# Patient Record
(demographics unavailable — no encounter records)

---

## 2025-04-29 NOTE — DATA REVIEWED
[MRI] : MRI [Left] : left [Ankle] : ankle [Report was reviewed and noted in the chart] : The report was reviewed and noted in the chart [I independently reviewed and interpreted images and report] : I independently reviewed and interpreted images and report [I reviewed the films/CD] : I reviewed the films/CD [FreeTextEntry1] : MRI left ankle reveals evidence of multiple ligament sprains, intact syndesmotic ligaments, no bone marrow edema about lateral physis, mild bone contusion about medial malleolus

## 2025-04-29 NOTE — HISTORY OF PRESENT ILLNESS
[de-identified] : The patient is a 12 year  old RT hand dominant female who presents today complaining of LT Ankle pain.   Date of Injury/Onset: 4/25/25 Pain:    At Rest: 5/10  With Activity:  9/10  Mechanism of injury: coming down from jumping in a basketball game and twisted her ankle when she landed This is NOT a Work Related Injury being treated under Worker's Compensation. This IS an athletic injury occurring associated with an interscholastic or organized sports team. 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

## 2025-04-29 NOTE — IMAGING
[Left] : left ankle [Weight -] : weightbearing [de-identified] : The patient is a well appearing 12 year  old female of their stated age. Patient ambulates IN LONG PNEUMATIC CAM BOOT. Negative straight leg raise.   Effected Foot and Ankle: LEFT    Inspection: Erythema: None Ecchymosis: None Abrasions: None Effusion: None Deformity: None Pes [place] Valgus: Negative Pes Cavus: Negative   Palpation: Crepitus: None Medial Malleolus: Nontender Lateral Malleolus: Nontender Syndesmosis: TENDER  Distal Fibula: TENDER  ATFL: Nontender CFL: Nontender Deltoid: Nontender Calcaneus: Nontender Talar Head/Neck: Nontender Peroneal Tendons: Nontender Posterior Tibialis: Nontender Achilles Tendon: Nontender & Intact Anterior Capsule: Nontender Hindfoot: Nontender Midfoot: Nontender Forefoot: Nontender   ROM: Ankle Dorsiflexion: 30 degrees Ankle Plantar Flexion: 45 degrees Motor: Dorsiflexion: 5 out of 5 Plantar Flexion: 5 out of 5 Inversion: 4+ out of 5 Eversion: 4+ out of 5 EHL: 5 out of 5 FHL: 5 out of 5   Provocative Testing: Anterior Drawer: Negative Syndesmosis Squeeze Test: Negative Carlton's Test: Negative Midfoot Stability/Rotation: Negative Heel Raise Inversion: Normal Triple Hop: POSITIVE  Neurologic Exam: L4-S1 Sensation: Grossly Intact Vascular Exam/Pulses: Dorsalis Pedis: 2+ Posterior Tibialis: 2+ Capillary Refill: <2 Seconds Other Exams: None   Pertinent Contralateral Findings: None   Assessment: The patient is a 12-year-old female with left ankle pain and radiographic and physical exam findings consistent with ankle sprain. The patient's condition is acute Documents/Results Reviewed Today: X-Ray and MRI left ankle  Tests/Studies Independently Interpreted Today: X-Ray left ankle reveals evidence of skeletally immature individual, widening if distal fibula physis. MRI left ankle reveals evidence of multiple ligament sprains, intact syndesmotic ligaments, no bone marrow edema about lateral physis, mild bone contusion about medial malleolus.  Pertinent findings include: Intact ankle range of motion, mild weakness, tender distal fibula, tender syndesmosis.  Confounding medical conditions/concerns: None   Plan: Start physical therapy, HEP, and stretching for ankle sprain. The patient will continue use of long Pneumatic CAM boot to ensure stability and proper healing of ankle sprain. Discussed appropriate use of OTC anti-inflammatories as needed for pain, inflammation, and discomfort - use as directed and take with food. Upon review of radiographic imaging, we are not concerned for any physeal injury as they appear to be closing. The patient will follow up in 2 weeks to discuss possible transition into lace-up ankle brace. Until then, the patient is out of gym/sport specific activity. Modify activity as discussed. Tests Ordered: None  Prescription Medications Ordered: Discussed appropriate use of OTC anti-inflammatories and analgesics (including but not limited to Aleve, Advil, Tylenol, Motrin, Ibuprofen, Voltaren gel, etc.) Braces/DME Ordered: Continue use of pneumatic CAM boot  Activity/Work/Sports Status: Shut down from gym & sports  Additional Instructions: None Follow-Up: 2 weeks   The patient's current medication management of their orthopedic diagnosis was reviewed today: The patient declined and/or was contraindicated for the recommended prescription medication Naprosyn and will use over the counter Advil, Aleve, Voltaren Gel or Tylenol as directed. (1) We discussed a comprehensive treatment plan that included possible pharmaceutical management involving the use of prescription strength medications including but not limited to options such as oral Naprosyn 500mg BID, once daily Meloxicam 15 mg, or 500-650 mg Tylenol versus over the counter oral medications and topical prescription NSAID Pennsaid vs over the counter Voltaren gel.  Based on our extensive discussion, the patient declined prescription medication and will use over the counter Advil, Alleve, Voltaren Gel or Tylenol as directed. (2) There is a moderate risk of morbidity with further treatment, especially from use of prescription strength medications and possible side effects of these medications which include upset stomach with oral medications, skin reactions to topical medications and cardiac/renal issues with long term use. (3) I recommended that the patient follow-up with their medical physician to discuss any significant specific potential issues with long term medication use such as interactions with current medications or with exacerbation of underlying medical comorbidities. (4) The benefits and risks associated with use of injectable, oral or topical, prescription and over the counter anti-inflammatory medications were discussed with the patient. The patient voiced understanding of the risks including but not limited to bleeding, stroke, kidney dysfunction, heart disease, and were referred to the black box warning label for further information.   ILivier attest that this documentation has been prepared under the direction and in the presence of Provider Dr. Cody Mak.   The documentation recorded by the scribe accurately reflects the services IDr. Cody, personally performed and the decisions made by me. [FreeTextEntry9] : X-Ray left ankle reveals evidence of skeletally immature individual, widening if distal fibula physis

## 2025-04-29 NOTE — HISTORY OF PRESENT ILLNESS
[de-identified] : The patient is a 12 year  old RT hand dominant female who presents today complaining of LT Ankle pain.   Date of Injury/Onset: 4/25/25 Pain:    At Rest: 5/10  With Activity:  9/10  Mechanism of injury: coming down from jumping in a basketball game and twisted her ankle when she landed This is NOT a Work Related Injury being treated under Worker's Compensation. This IS an athletic injury occurring associated with an interscholastic or organized sports team. 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

## 2025-04-29 NOTE — IMAGING
[Left] : left ankle [Weight -] : weightbearing [de-identified] : The patient is a well appearing 12 year  old female of their stated age. Patient ambulates IN LONG PNEUMATIC CAM BOOT. Negative straight leg raise.   Effected Foot and Ankle: LEFT    Inspection: Erythema: None Ecchymosis: None Abrasions: None Effusion: None Deformity: None Pes [place] Valgus: Negative Pes Cavus: Negative   Palpation: Crepitus: None Medial Malleolus: Nontender Lateral Malleolus: Nontender Syndesmosis: TENDER  Distal Fibula: TENDER  ATFL: Nontender CFL: Nontender Deltoid: Nontender Calcaneus: Nontender Talar Head/Neck: Nontender Peroneal Tendons: Nontender Posterior Tibialis: Nontender Achilles Tendon: Nontender & Intact Anterior Capsule: Nontender Hindfoot: Nontender Midfoot: Nontender Forefoot: Nontender   ROM: Ankle Dorsiflexion: 30 degrees Ankle Plantar Flexion: 45 degrees Motor: Dorsiflexion: 5 out of 5 Plantar Flexion: 5 out of 5 Inversion: 4+ out of 5 Eversion: 4+ out of 5 EHL: 5 out of 5 FHL: 5 out of 5   Provocative Testing: Anterior Drawer: Negative Syndesmosis Squeeze Test: Negative Carlton's Test: Negative Midfoot Stability/Rotation: Negative Heel Raise Inversion: Normal Triple Hop: POSITIVE  Neurologic Exam: L4-S1 Sensation: Grossly Intact Vascular Exam/Pulses: Dorsalis Pedis: 2+ Posterior Tibialis: 2+ Capillary Refill: <2 Seconds Other Exams: None   Pertinent Contralateral Findings: None   Assessment: The patient is a 12-year-old female with left ankle pain and radiographic and physical exam findings consistent with ankle sprain. The patient's condition is acute Documents/Results Reviewed Today: X-Ray and MRI left ankle  Tests/Studies Independently Interpreted Today: X-Ray left ankle reveals evidence of skeletally immature individual, widening if distal fibula physis. MRI left ankle reveals evidence of multiple ligament sprains, intact syndesmotic ligaments, no bone marrow edema about lateral physis, mild bone contusion about medial malleolus.  Pertinent findings include: Intact ankle range of motion, mild weakness, tender distal fibula, tender syndesmosis.  Confounding medical conditions/concerns: None   Plan: Start physical therapy, HEP, and stretching for ankle sprain. The patient will continue use of long Pneumatic CAM boot to ensure stability and proper healing of ankle sprain. Discussed appropriate use of OTC anti-inflammatories as needed for pain, inflammation, and discomfort - use as directed and take with food. Upon review of radiographic imaging, we are not concerned for any physeal injury as they appear to be closing. The patient will follow up in 2 weeks to discuss possible transition into lace-up ankle brace. Until then, the patient is out of gym/sport specific activity. Modify activity as discussed. Tests Ordered: None  Prescription Medications Ordered: Discussed appropriate use of OTC anti-inflammatories and analgesics (including but not limited to Aleve, Advil, Tylenol, Motrin, Ibuprofen, Voltaren gel, etc.) Braces/DME Ordered: Continue use of pneumatic CAM boot  Activity/Work/Sports Status: Shut down from gym & sports  Additional Instructions: None Follow-Up: 2 weeks   The patient's current medication management of their orthopedic diagnosis was reviewed today: The patient declined and/or was contraindicated for the recommended prescription medication Naprosyn and will use over the counter Advil, Aleve, Voltaren Gel or Tylenol as directed. (1) We discussed a comprehensive treatment plan that included possible pharmaceutical management involving the use of prescription strength medications including but not limited to options such as oral Naprosyn 500mg BID, once daily Meloxicam 15 mg, or 500-650 mg Tylenol versus over the counter oral medications and topical prescription NSAID Pennsaid vs over the counter Voltaren gel.  Based on our extensive discussion, the patient declined prescription medication and will use over the counter Advil, Alleve, Voltaren Gel or Tylenol as directed. (2) There is a moderate risk of morbidity with further treatment, especially from use of prescription strength medications and possible side effects of these medications which include upset stomach with oral medications, skin reactions to topical medications and cardiac/renal issues with long term use. (3) I recommended that the patient follow-up with their medical physician to discuss any significant specific potential issues with long term medication use such as interactions with current medications or with exacerbation of underlying medical comorbidities. (4) The benefits and risks associated with use of injectable, oral or topical, prescription and over the counter anti-inflammatory medications were discussed with the patient. The patient voiced understanding of the risks including but not limited to bleeding, stroke, kidney dysfunction, heart disease, and were referred to the black box warning label for further information.   ILivier attest that this documentation has been prepared under the direction and in the presence of Provider Dr. Cody Mak.   The documentation recorded by the scribe accurately reflects the services IDr. Cody, personally performed and the decisions made by me. [FreeTextEntry9] : X-Ray left ankle reveals evidence of skeletally immature individual, widening if distal fibula physis

## 2025-04-30 NOTE — HISTORY OF PRESENT ILLNESS
[de-identified] : The patient is a 12 year old [RIGHT/LEFT] hand dominant female who presents today complaining of left ankle. Date of Injury/Onset: 4/25/25 Pain: At Rest: 6/10 With Activity: 8/10 Mechanism of injury: patient states she rolled her left ankle over in a basketball game This is NOT a Work Related Injury being treated under Worker's Compensation. This is an athletic injury occurring associated with an interscholastic or organized sports team. Quality of symptoms: sharp pain on lateral side of left ankle  Improves with: ice, ibuprofen Worse with: walking, stairs Prior treatment: none Prior Imaging: none  Out of work/sport: unknown currently  School/Sport/Position/Occupation: 6th grade @ [employer], basketball, lacrosse Additional Information: None   Tran Turpin a 12-year-old female accompanied by her mother presents to orthopedic urgent care clinic with L ankle injury from basketball. While jumping, she landed and inverted ankle. Pain is localized to lateral malleolus, constant but worsens with weight-bearing. She is limping when walking. The patient denies numbness, tingling, significant bruising or swelling. She is unable to hop on affected foot. There is increased pain with ankle flexion/extension. She can walk with difficulty and pain.  Review of Systems (ROS): Musculoskeletal: (+) ankle pain, difficulty walking, limping Neurological: (-) numbness or tingling in feet

## 2025-04-30 NOTE — IMAGING
[de-identified] : The patient is a well appearing 12 year old female of their stated age.  Mother present in exam room Patient ambulates with a antalgic gait. Negative straight leg raise.  Effected Foot and Ankle:  Inspection: Erythema: None Ecchymosis: None Abrasions: None Effusion: None Deformity: None Pes [place] Valgus: Negative Pes Cavus: Negative  Palpation: Crepitus: None Medial Maleolus: Nontender Lateral Maleolus: Nontender Syndesmosis: Tender Proximal Fibula: Nontender Distal fibula: Tender ATFL: Nontender CFL: Nontender Deltoid: Nontender Calcaneus: Nontender [place]ar Head/Neck: Nontender Peroneal Tendons: Nontender Posterior Tibialis: Nontender Achilles Tendon: Nontender & Intact Anterior Capsule: Nontender Hindfoot: Nontender Midfoot: Nontender Forefoot: Nontender  ROM: Ankle Dorsiflexion: 30 degrees Ankle Plantar Flexion: 45 degrees Motor: Dorsiflexion: 5 out of 5 Plantar Flexion: 5 out of 5 Inversion: 4 out of 5 Eversion: 4 out of 5 EHL: 5 out of 5 FHL: 5 out of 5  Provocative Testing: Anterior Drawer: Negative Syndesmosis Squeeze Test: Positive Carlton's Test: Negative Midfoot Stability/Rotation: Negative Heel Raise Inversion: Normal Triple Hop: Positive Neurologic Exam: L4-S1 Sensation: Grossly Intact Vascular Exam/Pulses: Dorsalis Pedis: 2+ Posterior Tibialis: 2+ Capillary Refill: <2 Seconds Other Exams: None  Pertinent Contralateral Findings: None  Assessment: The patient is a 12 year old female with acute inversion ankle injury while playing basketball and radiographic and physical exam findings consistent with inversion ankle sprain with concern for occult injury. The patients condition is acute Documents/Results Reviewed Today: Radiographs completed in clinic Tests/Studies Independently Interpreted Today: Three-view plain film radiograph of the ankle.   Pertinent findings include:No acute osseous abnormalities.  Slight irregularity noted to the fibula growth plate. Confounding medical conditions/concerns: None  Plan: Patient was given a follow-up appointment with orthopedic foot and ankle specialist for further evaluation and management.  Appointment will be provided by the urgent care  staff prior to discharge. Treat the patient's pain with naproxen 250 mg p.o. twice daily for 7 to 14 days as needed.  Counseled the risk of GI bleeding renal injury prolonged use NSAIDs. Encouraged Price.  10 to 15 minutes of ice every 1-2 hours for the next 72 hours.  Then every 2-3 hours thereafter as needed for pain relief.  Never place ice directly on the skin as it can cause damage. Patient fitted for and provided cam walker boot and crutches in clinic.  Proper crutch training completed. Radiographs reviewed with the patient at the bedside.  Ample time provided to ask questions. Activity restrictions were discussed in clinic. Patient given a prescription for physical therapy.  Do not start until after seeing orthopedic specialist. Patient educated on diagnosis and provided reassurance.  Answered all questions. Tests Ordered:  Due to worsening pain and instability with mechanical symptoms, patient will obtain MRI to eval for concern for occult injury over the fibular growth plate.  Evaluate for ligamentous integrity. Prescription Medications Ordered: 250 mg naproxen Braces/DME Ordered: CAM Walker boot short, crutches Activity/Work/Sports Status: Activity restrictions discussed.  School note provided for no sports or gym until cleared by orthopedics.  School accommodations also provided. Additional Instructions: None Follow-Up: Follow-up with orthopedic specialist which you were given appointment for today by the orthopedic urgent care  staff as directed.Follow-up with primary care manager for continuity of care. Discussed and reviewed current medications  Patient to continue with present medications - all medications reconciled/reviewed during this visit and listed above.    Increase fluid intake.  At least  40 minutes was spent with patient face to face examining and reviewing findings/results during this visit. Ample time was provided to answer any questions or address concerns to the patients satisfaction.  The patient's current medication management of their orthopedic diagnosis was reviewed today: (1) We discussed a comprehensive treatment plan that included possible pharmaceutical management involving the use of prescription strength medications including but not limited to options such as oral Naprosyn 500mg BID, once daily Meloxicam 15 mg, or 500-650 mg Tylenol versus over the counter oral medications and topical prescription NSAID Pennsaid vs over the counter Voltaren gel. (2) There is a moderate risk of morbidity with further treatment, especially from use of prescription strength medications and possible side effects of these medications which include upset stomach with oral medications, skin reactions to topical medications and cardiac/renal issues with long term use. (3) I recommended that the patient follow-up with their medical physician to discuss any significant specific potential issues with long term medication use such as interactions with current medications or with exacerbation of underlying medical comorbidities. (4) The benefits and risks associated with use of injectable, oral or topical, prescription and over the counter anti-inflammatory medications were discussed with the patient. The patient voiced understanding of the risks including but not limited to bleeding, stroke, kidney dysfunction, heart disease, and were referred to the black box warning label for further information.  [Left] : left ankle [Weight -] : weightbearing [Open growth plates] : Open growth plates [de-identified] : Slight irregularity noted near the fibular growth plate.  Concern for occult injury

## 2025-04-30 NOTE — IMAGING
[de-identified] : The patient is a well appearing 12 year old female of their stated age.  Mother present in exam room Patient ambulates with a antalgic gait. Negative straight leg raise.  Effected Foot and Ankle:  Inspection: Erythema: None Ecchymosis: None Abrasions: None Effusion: None Deformity: None Pes [place] Valgus: Negative Pes Cavus: Negative  Palpation: Crepitus: None Medial Maleolus: Nontender Lateral Maleolus: Nontender Syndesmosis: Tender Proximal Fibula: Nontender Distal fibula: Tender ATFL: Nontender CFL: Nontender Deltoid: Nontender Calcaneus: Nontender Talar Head/Neck: Nontender Peroneal Tendons: Nontender Posterior Tibialis: Nontender Achilles Tendon: Nontender & Intact Anterior Capsule: Nontender Hindfoot: Nontender Midfoot: Nontender Forefoot: Nontender  ROM: Ankle Dorsiflexion: 30 degrees Ankle Plantar Flexion: 45 degrees Motor: Dorsiflexion: 5 out of 5 Plantar Flexion: 5 out of 5 Inversion: 4 out of 5 Eversion: 4 out of 5 EHL: 5 out of 5 FHL: 5 out of 5  Provocative Testing: Anterior Drawer: Negative Syndesmosis Squeeze Test: Positive Carlton's Test: Negative Midfoot Stability/Rotation: Negative Heel Raise Inversion: Normal Triple Hop: Positive Neurologic Exam: L4-S1 Sensation: Grossly Intact Vascular Exam/Pulses: Dorsalis Pedis: 2+ Posterior Tibialis: 2+ Capillary Refill: <2 Seconds Other Exams: None  Pertinent Contralateral Findings: None  Assessment: The patient is a 12 year old female with acute inversion ankle injury while playing basketball and radiographic and physical exam findings consistent with inversion ankle sprain with concern for occult injury. The patients condition is acute Documents/Results Reviewed Today: Radiographs completed in clinic Tests/Studies Independently Interpreted Today: Three-view plain film radiograph of the ankle.   Pertinent findings include:No acute osseous abnormalities.  Slight irregularity noted to the fibula growth plate. Confounding medical conditions/concerns: None  Plan: Patient was given a follow-up appointment with orthopedic foot and ankle specialist for further evaluation and management.  Appointment will be provided by the urgent care  staff prior to discharge. Treat the patient's pain with naproxen 250 mg p.o. twice daily for 7 to 14 days as needed.  Counseled the risk of GI bleeding renal injury prolonged use NSAIDs. Encouraged Price.  10 to 15 minutes of ice every 1-2 hours for the next 72 hours.  Then every 2-3 hours thereafter as needed for pain relief.  Never place ice directly on the skin as it can cause damage. Patient fitted for and provided cam walker boot and crutches in clinic.  Proper crutch training completed. Radiographs reviewed with the patient at the bedside.  Ample time provided to ask questions. Activity restrictions were discussed in clinic. Patient given a prescription for physical therapy.  Do not start until after seeing orthopedic specialist. Patient educated on diagnosis and provided reassurance.  Answered all questions. Tests Ordered:  Due to worsening pain and instability with mechanical symptoms, patient will obtain MRI to eval for concern for occult injury over the fibular growth plate.  Evaluate for ligamentous integrity. Prescription Medications Ordered: 250 mg naproxen Braces/DME Ordered: CAM Walker boot short, crutches Activity/Work/Sports Status: Activity restrictions discussed.  School note provided for no sports or gym until cleared by orthopedics.  School accommodations also provided. Additional Instructions: None Follow-Up: Follow-up with orthopedic specialist which you were given appointment for today by the orthopedic urgent care  staff as directed.Follow-up with primary care manager for continuity of care. Discussed and reviewed current medications  Patient to continue with present medications - all medications reconciled/reviewed during this visit and listed above.    Increase fluid intake.  At least  40 minutes was spent with patient face to face examining and reviewing findings/results during this visit. Ample time was provided to answer any questions or address concerns to the patients satisfaction.  The patient's current medication management of their orthopedic diagnosis was reviewed today: (1) We discussed a comprehensive treatment plan that included possible pharmaceutical management involving the use of prescription strength medications including but not limited to options such as oral Naprosyn 500mg BID, once daily Meloxicam 15 mg, or 500-650 mg Tylenol versus over the counter oral medications and topical prescription NSAID Pennsaid vs over the counter Voltaren gel. (2) There is a moderate risk of morbidity with further treatment, especially from use of prescription strength medications and possible side effects of these medications which include upset stomach with oral medications, skin reactions to topical medications and cardiac/renal issues with long term use. (3) I recommended that the patient follow-up with their medical physician to discuss any significant specific potential issues with long term medication use such as interactions with current medications or with exacerbation of underlying medical comorbidities. (4) The benefits and risks associated with use of injectable, oral or topical, prescription and over the counter anti-inflammatory medications were discussed with the patient. The patient voiced understanding of the risks including but not limited to bleeding, stroke, kidney dysfunction, heart disease, and were referred to the black box warning label for further information.  [Left] : left ankle [Weight -] : weightbearing [Open growth plates] : Open growth plates [de-identified] : Slight irregularity noted near the fibular growth plate.  Concern for occult injury

## 2025-04-30 NOTE — HISTORY OF PRESENT ILLNESS
[de-identified] : The patient is a 12 year old [RIGHT/LEFT] hand dominant female who presents today complaining of left ankle. Date of Injury/Onset: 4/25/25 Pain: At Rest: 6/10 With Activity: 8/10 Mechanism of injury: patient states she rolled her left ankle over in a basketball game This is NOT a Work Related Injury being treated under Worker's Compensation. This is an athletic injury occurring associated with an interscholastic or organized sports team. Quality of symptoms: sharp pain on lateral side of left ankle  Improves with: ice, ibuprofen Worse with: walking, stairs Prior treatment: none Prior Imaging: none  Out of work/sport: unknown currently  School/Sport/Position/Occupation: 6th grade @ [employer], basketball, lacrosse Additional Information: None   Tran Turpin a 12-year-old female accompanied by her mother presents to orthopedic urgent care clinic with L ankle injury from basketball. While jumping, she landed and inverted ankle. Pain is localized to lateral malleolus, constant but worsens with weight-bearing. She is limping when walking. The patient denies numbness, tingling, significant bruising or swelling. She is unable to hop on affected foot. There is increased pain with ankle flexion/extension. She can walk with difficulty and pain.  Review of Systems (ROS): Musculoskeletal: (+) ankle pain, difficulty walking, limping Neurological: (-) numbness or tingling in feet

## 2025-05-13 NOTE — HISTORY OF PRESENT ILLNESS
[de-identified] : The patient is a 12 year  old RT hand dominant female who presents today complaining of LT Ankle pain.   Date of Injury/Onset: 4/25/25 Pain:    At Rest: 0/10  With Activity:  2/10  Mechanism of injury: coming down from jumping in a basketball game and twisted her ankle when she landed This is NOT a Work Related Injury being treated under Worker's Compensation. This IS an athletic injury occurring associated with an interscholastic or organized sports team. 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 Using cam boot. In PT 2x/week working on strengthening, some balance.  Additional Information: None

## 2025-05-13 NOTE — IMAGING
[de-identified] : The patient is a well appearing 12 year  old female of their stated age. Patient ambulates IN LONG PNEUMATIC CAM BOOT. Negative straight leg raise.   Effected Foot and Ankle: LEFT    Inspection: Erythema: None Ecchymosis: None Abrasions: None Effusion: None Deformity: None Pes [place] Valgus: Negative Pes Cavus: Negative   Palpation: Crepitus: None Medial Malleolus: Nontender Lateral Malleolus: Nontender Syndesmosis: MILDLY TENDER  Distal Fibula: Nontender  ATFL: TENDER  CFL: Nontender Deltoid: Nontender Calcaneus: Nontender Talar Head/Neck: Nontender Peroneal Tendons: Nontender Posterior Tibialis: Nontender Achilles Tendon: Nontender & Intact Anterior Capsule: Nontender Hindfoot: Nontender Midfoot: Nontender Forefoot: Nontender   ROM: Ankle Dorsiflexion: 30 degrees Ankle Plantar Flexion: 45 degrees Motor: Dorsiflexion: 5 out of 5 Plantar Flexion: 5 out of 5 Inversion: 5 out of 5 Eversion: 5 out of 5 EHL: 5 out of 5 FHL: 5 out of 5   Provocative Testing: Anterior Drawer: Negative Syndesmosis Squeeze Test: Negative Carlton's Test: Negative Midfoot Stability/Rotation: Negative Heel Raise Inversion: Normal Triple Hop: Negative  Neurologic Exam: L4-S1 Sensation: Grossly Intact Vascular Exam/Pulses: Dorsalis Pedis: 2+ Posterior Tibialis: 2+ Capillary Refill: <2 Seconds Other Exams: None   Pertinent Contralateral Findings: None   Assessment: The patient is a 12-year-old female with left ankle pain and radiographic and physical exam findings consistent with resolved ankle sprain. The patient's condition is acute Documents/Results Reviewed Today: None  Tests/Studies Independently Interpreted Today: None  Pertinent findings include: Intact ankle range of motion, trace effusion, tender ATFL, mildly tender syndesmosis, -syndesmosis squeeze test, -triple hop.   Confounding medical conditions/concerns: None   Plan: The patient reports gradual improvement in pain and mechanical symptoms related to ankle sprain secondary to proper use of Pneumatic CAM boot. Continue physical therapy, HEP, and stretching. At this time, the patient will transition from pneumatic CAM boot into lace-up ankle support - fitted and dispensed in office today. Continue PRN use of Naproxen 500mg - use as directed and take with food. The patient will continue to work on ankle stability and proprioception as they increase back into sport specific activity. Reviewed the importance of increasing his activity gradually and avoiding fatigue. Modify activity as discussed. Tests Ordered: None  Prescription Medications Ordered: Discussed appropriate use of OTC anti-inflammatories and analgesics (including but not limited to Aleve, Advil, Tylenol, Motrin, Ibuprofen, Voltaren gel, etc.) Braces/DME Ordered: Lace-up ankle brace   Activity/Work/Sports Status: Cleared in lace-up; use for next 6-8 weeks  Additional Instructions: None Follow-Up: PRN   The patient's current medication management of their orthopedic diagnosis was reviewed today: The patient declined and/or was contraindicated for the recommended prescription medication Naprosyn and will use over the counter Advil, Aleve, Voltaren Gel or Tylenol as directed. (1) We discussed a comprehensive treatment plan that included possible pharmaceutical management involving the use of prescription strength medications including but not limited to options such as oral Naprosyn 500mg BID, once daily Meloxicam 15 mg, or 500-650 mg Tylenol versus over the counter oral medications and topical prescription NSAID Pennsaid vs over the counter Voltaren gel.  Based on our extensive discussion, the patient declined prescription medication and will use over the counter Advil, Alleve, Voltaren Gel or Tylenol as directed. (2) There is a moderate risk of morbidity with further treatment, especially from use of prescription strength medications and possible side effects of these medications which include upset stomach with oral medications, skin reactions to topical medications and cardiac/renal issues with long term use. (3) I recommended that the patient follow-up with their medical physician to discuss any significant specific potential issues with long term medication use such as interactions with current medications or with exacerbation of underlying medical comorbidities. (4) The benefits and risks associated with use of injectable, oral or topical, prescription and over the counter anti-inflammatory medications were discussed with the patient. The patient voiced understanding of the risks including but not limited to bleeding, stroke, kidney dysfunction, heart disease, and were referred to the black box warning label for further information.   Livier SMITH attest that this documentation has been prepared under the direction and in the presence of Provider Dr. Cody Mak.   The documentation recorded by the scribe accurately reflects the services I, Dr. Cody Mak, personally performed and the decisions made by me.

## 2025-05-13 NOTE — IMAGING
[de-identified] : The patient is a well appearing 12 year  old female of their stated age. Patient ambulates IN LONG PNEUMATIC CAM BOOT. Negative straight leg raise.   Effected Foot and Ankle: LEFT    Inspection: Erythema: None Ecchymosis: None Abrasions: None Effusion: None Deformity: None Pes [place] Valgus: Negative Pes Cavus: Negative   Palpation: Crepitus: None Medial Malleolus: Nontender Lateral Malleolus: Nontender Syndesmosis: MILDLY TENDER  Distal Fibula: Nontender  ATFL: TENDER  CFL: Nontender Deltoid: Nontender Calcaneus: Nontender Talar Head/Neck: Nontender Peroneal Tendons: Nontender Posterior Tibialis: Nontender Achilles Tendon: Nontender & Intact Anterior Capsule: Nontender Hindfoot: Nontender Midfoot: Nontender Forefoot: Nontender   ROM: Ankle Dorsiflexion: 30 degrees Ankle Plantar Flexion: 45 degrees Motor: Dorsiflexion: 5 out of 5 Plantar Flexion: 5 out of 5 Inversion: 5 out of 5 Eversion: 5 out of 5 EHL: 5 out of 5 FHL: 5 out of 5   Provocative Testing: Anterior Drawer: Negative Syndesmosis Squeeze Test: Negative Carlton's Test: Negative Midfoot Stability/Rotation: Negative Heel Raise Inversion: Normal Triple Hop: Negative  Neurologic Exam: L4-S1 Sensation: Grossly Intact Vascular Exam/Pulses: Dorsalis Pedis: 2+ Posterior Tibialis: 2+ Capillary Refill: <2 Seconds Other Exams: None   Pertinent Contralateral Findings: None   Assessment: The patient is a 12-year-old female with left ankle pain and radiographic and physical exam findings consistent with resolved ankle sprain. The patient's condition is acute Documents/Results Reviewed Today: None  Tests/Studies Independently Interpreted Today: None  Pertinent findings include: Intact ankle range of motion, trace effusion, tender ATFL, mildly tender syndesmosis, -syndesmosis squeeze test, -triple hop.   Confounding medical conditions/concerns: None   Plan: The patient reports gradual improvement in pain and mechanical symptoms related to ankle sprain secondary to proper use of Pneumatic CAM boot. Continue physical therapy, HEP, and stretching. At this time, the patient will transition from pneumatic CAM boot into lace-up ankle support - fitted and dispensed in office today. Continue PRN use of Naproxen 500mg - use as directed and take with food. The patient will continue to work on ankle stability and proprioception as they increase back into sport specific activity. Reviewed the importance of increasing his activity gradually and avoiding fatigue. Modify activity as discussed. Tests Ordered: None  Prescription Medications Ordered: Discussed appropriate use of OTC anti-inflammatories and analgesics (including but not limited to Aleve, Advil, Tylenol, Motrin, Ibuprofen, Voltaren gel, etc.) Braces/DME Ordered: Lace-up ankle brace   Activity/Work/Sports Status: Cleared in lace-up; use for next 6-8 weeks  Additional Instructions: None Follow-Up: PRN   The patient's current medication management of their orthopedic diagnosis was reviewed today: The patient declined and/or was contraindicated for the recommended prescription medication Naprosyn and will use over the counter Advil, Aleve, Voltaren Gel or Tylenol as directed. (1) We discussed a comprehensive treatment plan that included possible pharmaceutical management involving the use of prescription strength medications including but not limited to options such as oral Naprosyn 500mg BID, once daily Meloxicam 15 mg, or 500-650 mg Tylenol versus over the counter oral medications and topical prescription NSAID Pennsaid vs over the counter Voltaren gel.  Based on our extensive discussion, the patient declined prescription medication and will use over the counter Advil, Alleve, Voltaren Gel or Tylenol as directed. (2) There is a moderate risk of morbidity with further treatment, especially from use of prescription strength medications and possible side effects of these medications which include upset stomach with oral medications, skin reactions to topical medications and cardiac/renal issues with long term use. (3) I recommended that the patient follow-up with their medical physician to discuss any significant specific potential issues with long term medication use such as interactions with current medications or with exacerbation of underlying medical comorbidities. (4) The benefits and risks associated with use of injectable, oral or topical, prescription and over the counter anti-inflammatory medications were discussed with the patient. The patient voiced understanding of the risks including but not limited to bleeding, stroke, kidney dysfunction, heart disease, and were referred to the black box warning label for further information.   Livier SMITH attest that this documentation has been prepared under the direction and in the presence of Provider Dr. Cody Mak.   The documentation recorded by the scribe accurately reflects the services I, Dr. Cody Mak, personally performed and the decisions made by me.

## 2025-05-13 NOTE — HISTORY OF PRESENT ILLNESS
[de-identified] : The patient is a 12 year  old RT hand dominant female who presents today complaining of LT Ankle pain.   Date of Injury/Onset: 4/25/25 Pain:    At Rest: 0/10  With Activity:  2/10  Mechanism of injury: coming down from jumping in a basketball game and twisted her ankle when she landed This is NOT a Work Related Injury being treated under Worker's Compensation. This IS an athletic injury occurring associated with an interscholastic or organized sports team. 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 Using cam boot. In PT 2x/week working on strengthening, some balance.  Additional Information: None